# Patient Record
Sex: FEMALE | Race: OTHER | Employment: UNEMPLOYED | URBAN - METROPOLITAN AREA
[De-identification: names, ages, dates, MRNs, and addresses within clinical notes are randomized per-mention and may not be internally consistent; named-entity substitution may affect disease eponyms.]

---

## 2021-07-13 ENCOUNTER — OFFICE VISIT (OUTPATIENT)
Dept: PRIMARY CARE CLINIC | Age: 46
End: 2021-07-13
Payer: COMMERCIAL

## 2021-07-13 VITALS
OXYGEN SATURATION: 99 % | WEIGHT: 137.6 LBS | BODY MASS INDEX: 22.92 KG/M2 | HEART RATE: 61 BPM | HEIGHT: 65 IN | SYSTOLIC BLOOD PRESSURE: 118 MMHG | TEMPERATURE: 97.5 F | DIASTOLIC BLOOD PRESSURE: 75 MMHG | RESPIRATION RATE: 15 BRPM

## 2021-07-13 DIAGNOSIS — Z11.59 NEED FOR HEPATITIS C SCREENING TEST: ICD-10-CM

## 2021-07-13 DIAGNOSIS — Z12.31 ENCOUNTER FOR SCREENING MAMMOGRAM FOR MALIGNANT NEOPLASM OF BREAST: ICD-10-CM

## 2021-07-13 DIAGNOSIS — Z00.00 PHYSICAL EXAM: Primary | ICD-10-CM

## 2021-07-13 DIAGNOSIS — E55.9 VITAMIN D DEFICIENCY: ICD-10-CM

## 2021-07-13 DIAGNOSIS — D50.8 OTHER IRON DEFICIENCY ANEMIA: ICD-10-CM

## 2021-07-13 DIAGNOSIS — Z12.11 COLON CANCER SCREENING: ICD-10-CM

## 2021-07-13 PROCEDURE — 99386 PREV VISIT NEW AGE 40-64: CPT | Performed by: INTERNAL MEDICINE

## 2021-07-13 RX ORDER — BISMUTH SUBSALICYLATE 262 MG
1 TABLET,CHEWABLE ORAL DAILY
COMMUNITY

## 2021-07-13 NOTE — PROGRESS NOTES
Chief Complaint   Patient presents with    New Patient     est. patient       Visit Vitals  /75 (BP 1 Location: Right arm)   Pulse 61   Temp 97.5 °F (36.4 °C)   Resp 15   Ht 5' 5\" (1.651 m)   Wt 137 lb 9.6 oz (62.4 kg)   SpO2 99%   BMI 22.90 kg/m²       1. Have you been to the ER, urgent care clinic since your last visit? Hospitalized since your last visit? No    2. Have you seen or consulted any other health care providers outside of the [address] [place] [address] since your last visit? Include any pap smears or colon screening.  No

## 2021-07-13 NOTE — PROGRESS NOTES
Sukhdev Silva (: 1975) is a 54 y.o. female, new patient, here for evaluation of the following chief complaint(s):  New Patient (est. patient)   Written by Santo Rich, as dictated by Dr. Gwen Vo MD.      ASSESSMENT/PLAN:  Below is the assessment and plan developed based on review of pertinent history, physical exam, labs, studies, and medications. 1. Physical exam  Complete physical done today. Routine lab work ordered. Waiting for results. -     METABOLIC PANEL, COMPREHENSIVE; Future  -     CBC W/O DIFF; Future  -     LIPID PANEL; Future  -     TSH 3RD GENERATION; Future    2. Need for hepatitis C screening test  Ordered Hepatitis C test. Waiting for results.   -     HEPATITIS C AB; Future    3. Encounter for screening mammogram for malignant neoplasm of breast  Ordered screening mammogram. Waiting for results. -     CARLOS 3D ISSAC W MAMMO BI SCREENING INCL CAD; Future    4. Vitamin D deficiency  Ordered lab work to check Vitamin D levels. Waiting for results. Recommend that patient take a Vitamin D supplement of 1,000 units daily. -     VITAMIN D, 25 HYDROXY; Future    5. Other iron deficiency anemia  Ordered lab work to check Vitamin B12 levels. Waiting for results. Recommend that patient take a Vitamin B12 supplement daily. -     VITAMIN B12; Future    6. Colon cancer screening  I ordered a stool test for health maintenance. -     OCCULT BLOOD IMMUNOASSAY,DIAGNOSTIC; Future      SUBJECTIVE/OBJECTIVE:  HPI  Patient presents today to establish care and requesting complete physical exam.  She has not been followed by any physican for 15 years. She does not work. She has 2 sons 25and 13years of age. She c/o heavy periods for the past few years. She has not had a mammogram or pap smear done. She is not followed by OB/GYN. She denies fhx of breast cancer. She is vegetarian. She denies fatigue and numbness or tingling in legs.     She has a hx of anemia and B12 deficiency. She denies any seasonal allergies. She c/o mild nocturia, but drinks lots of water. She sleeps well. She walks for exercise. She c/o BL leg swelling while flying. She denies increased sodium intake. She denies fhx of colon cancer. Current Outpatient Medications on File Prior to Visit   Medication Sig Dispense Refill    multivitamin (ONE A DAY) tablet Take 1 Tablet by mouth daily. No current facility-administered medications on file prior to visit. History reviewed. No pertinent past medical history. Past Surgical History:   Procedure Laterality Date    HX TUBAL LIGATION  2005       Family History   Problem Relation Age of Onset    Diabetes Father     Hypertension Father        Social History     Socioeconomic History    Marital status:      Spouse name: Not on file    Number of children: Not on file    Years of education: Not on file    Highest education level: Not on file   Occupational History    Not on file   Tobacco Use    Smoking status: Never Smoker    Smokeless tobacco: Never Used   Vaping Use    Vaping Use: Never used   Substance and Sexual Activity    Alcohol use: Never    Drug use: Never    Sexual activity: Yes     Partners: Male   Other Topics Concern    Not on file   Social History Narrative    Not on file     Social Determinants of Health     Financial Resource Strain:     Difficulty of Paying Living Expenses:    Food Insecurity:     Worried About Running Out of Food in the Last Year:     Ran Out of Food in the Last Year:    Transportation Needs:     Lack of Transportation (Medical):      Lack of Transportation (Non-Medical):    Physical Activity:     Days of Exercise per Week:     Minutes of Exercise per Session:    Stress:     Feeling of Stress :    Social Connections:     Frequency of Communication with Friends and Family:     Frequency of Social Gatherings with Friends and Family:     Attends Mormonism Services:     Active Member of Clubs or Organizations:     Attends Club or Organization Meetings:     Marital Status:    Intimate Partner Violence:     Fear of Current or Ex-Partner:     Emotionally Abused:     Physically Abused:     Sexually Abused:        No results found for any previous visit. Review of Systems   Constitutional: Negative for activity change, fatigue and unexpected weight change. HENT: Negative for congestion, hearing loss, rhinorrhea and sore throat. Eyes: Negative for discharge. Respiratory: Negative for cough, chest tightness and shortness of breath. Cardiovascular: Negative for leg swelling. Gastrointestinal: Negative for abdominal pain, constipation and diarrhea. Genitourinary: Negative for dysuria, flank pain, frequency and urgency. Heavy cycle   Musculoskeletal: Negative for arthralgias, back pain and myalgias. Skin: Negative for color change and rash. Neurological: Negative for dizziness, light-headedness and headaches. Psychiatric/Behavioral: Negative for dysphoric mood and sleep disturbance. The patient is not nervous/anxious. Visit Vitals  /75 (BP 1 Location: Right arm)   Pulse 61   Temp 97.5 °F (36.4 °C)   Resp 15   Ht 5' 5\" (1.651 m)   Wt 137 lb 9.6 oz (62.4 kg)   SpO2 99%   BMI 22.90 kg/m²      Physical Exam  Vitals and nursing note reviewed. Constitutional:       General: She is not in acute distress. Appearance: Normal appearance. She is well-developed. She is not diaphoretic. HENT:      Right Ear: External ear normal.      Left Ear: External ear normal.   Eyes:      General: No scleral icterus. Right eye: No discharge. Left eye: No discharge. Extraocular Movements: Extraocular movements intact. Conjunctiva/sclera: Conjunctivae normal.   Cardiovascular:      Rate and Rhythm: Normal rate and regular rhythm. Pulmonary:      Effort: Pulmonary effort is normal.      Breath sounds: Normal breath sounds. No wheezing. Abdominal:      General: Bowel sounds are normal.      Palpations: Abdomen is soft. Tenderness: There is no abdominal tenderness. Musculoskeletal:      Cervical back: Normal range of motion and neck supple. Lymphadenopathy:      Cervical: No cervical adenopathy. Neurological:      Mental Status: She is alert and oriented to person, place, and time. Psychiatric:         Mood and Affect: Mood and affect normal.         An electronic signature was used to authenticate this note.   -- Corrina Salgado

## 2021-08-25 ENCOUNTER — HOSPITAL ENCOUNTER (OUTPATIENT)
Dept: MAMMOGRAPHY | Age: 46
Discharge: HOME | End: 2021-08-25
Attending: INTERNAL MEDICINE
Payer: COMMERCIAL

## 2021-08-25 DIAGNOSIS — Z12.31 ENCOUNTER FOR SCREENING MAMMOGRAM FOR MALIGNANT NEOPLASM OF BREAST: ICD-10-CM

## 2021-08-25 PROCEDURE — 77063 BREAST TOMOSYNTHESIS BI: CPT

## 2021-08-27 ENCOUNTER — HOSPITAL ENCOUNTER (OUTPATIENT)
Dept: MAMMOGRAPHY | Age: 46
Discharge: HOME | End: 2021-08-27
Attending: INTERNAL MEDICINE
Payer: COMMERCIAL

## 2021-08-27 ENCOUNTER — HOSPITAL ENCOUNTER (OUTPATIENT)
Dept: MAMMOGRAPHY | Age: 46
Discharge: HOME | End: 2021-08-27
Payer: COMMERCIAL

## 2021-08-27 DIAGNOSIS — R92.8 ABNORMAL MAMMOGRAM OF RIGHT BREAST: ICD-10-CM

## 2021-08-27 PROCEDURE — 77065 DX MAMMO INCL CAD UNI: CPT

## 2021-08-27 PROCEDURE — 76642 ULTRASOUND BREAST LIMITED: CPT

## 2021-09-10 ENCOUNTER — HOSPITAL ENCOUNTER (OUTPATIENT)
Dept: MAMMOGRAPHY | Age: 46
Discharge: HOME | End: 2021-09-10
Attending: INTERNAL MEDICINE
Payer: COMMERCIAL

## 2021-09-10 DIAGNOSIS — R92.8 ABNORMAL MAMMOGRAM OF RIGHT BREAST: ICD-10-CM

## 2021-09-10 PROCEDURE — 77030020004 US BX BREAST VAC RT 1ST LESION W/CLIP AND SPECIMEN

## 2021-09-10 PROCEDURE — 74011000250 HC RX REV CODE- 250: Performed by: RADIOLOGY

## 2021-09-10 PROCEDURE — 77065 DX MAMMO INCL CAD UNI: CPT

## 2021-09-10 PROCEDURE — 88305 TISSUE EXAM BY PATHOLOGIST: CPT

## 2021-09-10 RX ORDER — LIDOCAINE HYDROCHLORIDE 10 MG/ML
5 INJECTION INFILTRATION; PERINEURAL
Status: COMPLETED | OUTPATIENT
Start: 2021-09-10 | End: 2021-09-10

## 2021-09-10 RX ORDER — LIDOCAINE HYDROCHLORIDE AND EPINEPHRINE 10; 10 MG/ML; UG/ML
10 INJECTION, SOLUTION INFILTRATION; PERINEURAL ONCE
Status: COMPLETED | OUTPATIENT
Start: 2021-09-10 | End: 2021-09-10

## 2021-09-10 RX ADMIN — LIDOCAINE HYDROCHLORIDE 5 ML: 10 INJECTION, SOLUTION INFILTRATION; PERINEURAL at 09:20

## 2021-09-10 RX ADMIN — LIDOCAINE HYDROCHLORIDE,EPINEPHRINE BITARTRATE 100 MG: 10; .01 INJECTION, SOLUTION INFILTRATION; PERINEURAL at 09:20

## 2021-09-13 NOTE — PROGRESS NOTES
Pathology approved by Dr. Juliana Fung. Called patient and relayed benign results. Advised patient that she should continue her annual mammogram schedule. She reports that her biopsy site is healing well with no concerns. Encouraged her to call with any question or concerns.

## 2021-10-18 ENCOUNTER — HOSPITAL ENCOUNTER (OUTPATIENT)
Dept: LAB | Age: 46
Discharge: HOME | End: 2021-10-18
Payer: COMMERCIAL

## 2021-10-18 ENCOUNTER — OFFICE VISIT (OUTPATIENT)
Dept: PRIMARY CARE CLINIC | Age: 46
End: 2021-10-18
Payer: COMMERCIAL

## 2021-10-18 VITALS
WEIGHT: 135.2 LBS | TEMPERATURE: 97.8 F | HEART RATE: 56 BPM | DIASTOLIC BLOOD PRESSURE: 71 MMHG | RESPIRATION RATE: 15 BRPM | HEIGHT: 65 IN | OXYGEN SATURATION: 100 % | BODY MASS INDEX: 22.53 KG/M2 | SYSTOLIC BLOOD PRESSURE: 109 MMHG

## 2021-10-18 DIAGNOSIS — Z23 NEED FOR DIPHTHERIA-TETANUS-PERTUSSIS (TDAP) VACCINE: ICD-10-CM

## 2021-10-18 DIAGNOSIS — Z01.419 WELL FEMALE EXAM WITH ROUTINE GYNECOLOGICAL EXAM: ICD-10-CM

## 2021-10-18 DIAGNOSIS — E55.9 VITAMIN D DEFICIENCY: ICD-10-CM

## 2021-10-18 DIAGNOSIS — D50.8 OTHER IRON DEFICIENCY ANEMIA: Primary | ICD-10-CM

## 2021-10-18 DIAGNOSIS — Z12.4 CERVICAL CANCER SCREENING: ICD-10-CM

## 2021-10-18 PROCEDURE — 87624 HPV HI-RISK TYP POOLED RSLT: CPT

## 2021-10-18 PROCEDURE — 88175 CYTOPATH C/V AUTO FLUID REDO: CPT

## 2021-10-18 PROCEDURE — 99213 OFFICE O/P EST LOW 20 MIN: CPT | Performed by: INTERNAL MEDICINE

## 2021-10-18 NOTE — PROGRESS NOTES
Chief Complaint   Patient presents with    Gyn Exam       Visit Vitals  /71 (BP 1 Location: Left arm)   Pulse (!) 56   Temp 97.8 °F (36.6 °C)   Resp 15   Ht 5' 5\" (1.651 m)   Wt 135 lb 3.2 oz (61.3 kg)   LMP 09/25/2021 (Exact Date)   SpO2 100%   BMI 22.50 kg/m²       1. Have you been to the ER, urgent care clinic since your last visit? Hospitalized since your last visit? No    2. Have you seen or consulted any other health care providers outside of the [address] [place] [address] since your last visit? Include any pap smears or colon screening.  No

## 2021-10-18 NOTE — PROGRESS NOTES
Berna Chappell (: 1975) is a 51 y.o. female, established patient, here for evaluation of the following chief complaint(s):  Gyn Exam     Written by Ben Montes, as dictated by Dr. Guerita Singer MD.      ASSESSMENT/PLAN:  Below is the assessment and plan developed based on review of pertinent history, physical exam, labs, studies, and medications. 1. Other iron deficiency anemia  Ordered labs to check CBC levels. Waiting for results. -     CBC WITH AUTOMATED DIFF; Future    2. Vitamin D deficiency  Ordered lab work to check Vitamin D levels. Waiting for results. Continue taking a Vitamin D supplement of 1,000 units daily. -     VITAMIN D, 25 HYDROXY; Future    3. Cervical cancer screening  Completed a Pap smear in the office today. Waiting for results. -     PAP IG, APTIMA HPV AND RFX 16/18,45 ([id number]); Future    4. Well female exam with routine gynecological exam  Completed a routine gynecological exam in the office today. Results were unremarkable. 5. Need for diphtheria-tetanus-pertussis (Tdap) vaccine  Prescribed a Tdap vaccine to be administered at her local pharmacy. -     diphth,pertus,acell,,tetanus (BOOSTRIX TDAP) 2.5-8-5 Lf-mcg-Lf/0.5mL susp suspension; 0.5 mL by IntraMUSCular route once for 1 dose., Normal, Disp-0.5 mL, R-0 sent to pharmacy. SUBJECTIVE/OBJECTIVE:  HPI     Patient presents today for a gynecological examination. She notes her menstrual cycle is regular and heavy. She does not have an IUD nor does she take oral contraceptive pills. She takes an OTC Vitamin D and multivitamin supplements daily. She does not take a Vitamin B or iron supplements. She has been trying to eat foods higher in iron to raise her HGB levels. Current Outpatient Medications on File Prior to Visit   Medication Sig Dispense Refill    multivitamin (ONE A DAY) tablet Take 1 Tablet by mouth daily. No current facility-administered medications on file prior to visit. Not on File    History reviewed. No pertinent past medical history. Past Surgical History:   Procedure Laterality Date    HX TUBAL LIGATION  2005       Family History   Problem Relation Age of Onset    Diabetes Father     Hypertension Father        Social History     Socioeconomic History    Marital status:      Spouse name: Not on file    Number of children: Not on file    Years of education: Not on file    Highest education level: Not on file   Occupational History    Not on file   Tobacco Use    Smoking status: Never Smoker    Smokeless tobacco: Never Used   Vaping Use    Vaping Use: Never used   Substance and Sexual Activity    Alcohol use: Never    Drug use: Never    Sexual activity: Yes     Partners: Male   Other Topics Concern    Not on file   Social History Narrative    Not on file     Social Determinants of Health     Financial Resource Strain:     Difficulty of Paying Living Expenses:    Food Insecurity:     Worried About Running Out of Food in the Last Year:     Ran Out of Food in the Last Year:    Transportation Needs:     Lack of Transportation (Medical):  Lack of Transportation (Non-Medical):    Physical Activity:     Days of Exercise per Week:     Minutes of Exercise per Session:    Stress:     Feeling of Stress :    Social Connections:     Frequency of Communication with Friends and Family:     Frequency of Social Gatherings with Friends and Family:     Attends Sabianist Services:     Active Member of Clubs or Organizations:     Attends Club or Organization Meetings:     Marital Status:    Intimate Partner Violence:     Fear of Current or Ex-Partner:     Emotionally Abused:     Physically Abused:     Sexually Abused:        No visits with results within 3 Month(s) from this visit.    Latest known visit with results is:   Orders Only on 07/13/2021   Component Date Value Ref Range Status    Vitamin B12 07/13/2021 358  193 - 986 pg/mL Final    Hep C virus Ab Interp. 07/13/2021 NONREACTIVE  NONREACTIVE   Final    Hep C  virus Ab comment 07/13/2021 Method used is Siemens Advia Centaur    Final    Vitamin D 25-Hydroxy 07/13/2021 18.3* 30 - 100 ng/mL Final    Comment: (NOTE)  Deficiency               <20 ng/mL  Insufficiency          20-30 ng/mL  Sufficient             ng/mL  Possible toxicity       >100 ng/mL    The Method used is Siemens Advia Centaur currently standardized to a   Center of Disease Control and Prevention (CDC) certified reference   [facility]. Samples containing fluorescein dye can produce falsely   elevated values when tested with the ADVIA Centaur Vitamin D Assay. It is recommended that results in the toxic range, >100 ng/mL, be   retested 72 hours post fluorescein exposure.  TSH 07/13/2021 2.55  0.36 - 3.74 uIU/mL Final    Comment:   Due to TSH heterogeneity, both structurally and degree of glycosylation,  monoclonal antibodies used in the TSH assay may not accurately quantitate TSH. Therefore, this result should be correlated with clinical findings as well as  with other assessments of thyroid function, e.g., free T4, free T3.  Cholesterol, total 07/13/2021 206* <200 MG/DL Final    Triglyceride 07/13/2021 63  <150 MG/DL Final    Comment: Based on NCEP-ATP III:  Triglycerides <150 mg/dL  is considered normal, 150-199  mg/dL  borderline high,  200-499 mg/dL high and  greater than or equal to 500  mg/dL very high.  HDL Cholesterol 07/13/2021 70  MG/DL Final    Comment: Based on NCEP ATP III, HDL Cholesterol <40 mg/dL is considered low and >60  mg/dL is elevated.       LDL, calculated 07/13/2021 123.4* 0 - 100 MG/DL Final    Comment: Based on the NCEP-ATP: LDL-C concentrations are considered  optimal <100 mg/dL,  near optimal/above Normal 100-129 mg/dL Borderline High: 130-159, High: 160-189  mg/dL Very High: Greater than or equal to 190 mg/dL      VLDL, calculated 07/13/2021 12.6  MG/DL Final    CHOL/HDL Ratio 07/13/2021 2.9  0.0 - 5.0   Final    WBC 07/13/2021 3.6  3.6 - 11.0 K/uL Final    RBC 07/13/2021 3.65* 3.80 - 5.20 M/uL Final    HGB 07/13/2021 9.2* 11.5 - 16.0 g/dL Final    HCT 07/13/2021 31.1* 35.0 - 47.0 % Final    MCV 07/13/2021 85.2  80.0 - 99.0 FL Final    MCH 07/13/2021 25.2* 26.0 - 34.0 PG Final    MCHC 07/13/2021 29.6* 30.0 - 36.5 g/dL Final    RDW 07/13/2021 15.7* 11.5 - 14.5 % Final    PLATELET 91/72/2864 991  150 - 400 K/uL Final    MPV 07/13/2021 10.3  8.9 - 12.9 FL Final    NRBC 07/13/2021 0.0  0  WBC Final    ABSOLUTE NRBC 07/13/2021 0.00  0.00 - 0.01 K/uL Final    Sodium 07/13/2021 138  136 - 145 mmol/L Final    Potassium 07/13/2021 4.1  3.5 - 5.1 mmol/L Final    Chloride 07/13/2021 108  97 - 108 mmol/L Final    CO2 07/13/2021 25  21 - 32 mmol/L Final    Anion gap 07/13/2021 5  5 - 15 mmol/L Final    Glucose 07/13/2021 85  65 - 100 mg/dL Final    BUN 07/13/2021 7  6 - 20 MG/DL Final    Creatinine 07/13/2021 0.58  0.55 - 1.02 MG/DL Final    BUN/Creatinine ratio 07/13/2021 12  12 - 20   Final    GFR est AA 07/13/2021 >60  >60 ml/min/1.73m2 Final    GFR est non-AA 07/13/2021 >60  >60 ml/min/1.73m2 Final    Comment: Estimated GFR is calculated using the IDMS-traceable Modification of Diet in  Renal Disease (MDRD) Study equation, reported for both  Americans  (GFRAA) and non- Americans (GFRNA), and normalized to 1.73m2 body  surface area. The physician must decide which value applies to the patient.  Calcium 07/13/2021 9.4  8.5 - 10.1 MG/DL Final    Bilirubin, total 07/13/2021 0.5  0.2 - 1.0 MG/DL Final    ALT (SGPT) 07/13/2021 12  12 - 78 U/L Final    AST (SGOT) 07/13/2021 16  15 - 37 U/L Final    Alk.  phosphatase 07/13/2021 51  45 - 117 U/L Final    Protein, total 07/13/2021 7.4  6.4 - 8.2 g/dL Final    Albumin 07/13/2021 3.7  3.5 - 5.0 g/dL Final    Globulin 07/13/2021 3.7  2.0 - 4.0 g/dL Final    A-G Ratio 07/13/2021 1.0* 1.1 - 2.2   Final       Review of Systems   Constitutional: Negative for activity change, fatigue and unexpected weight change. HENT: Negative for congestion, hearing loss, rhinorrhea and sore throat. Eyes: Negative for discharge. Respiratory: Negative for cough, chest tightness and shortness of breath. Cardiovascular: Negative for leg swelling. Gastrointestinal: Negative for abdominal pain, constipation and diarrhea. Genitourinary: Negative for dysuria, flank pain, frequency and urgency. Musculoskeletal: Negative for arthralgias, back pain and myalgias. Skin: Negative for color change and rash. Neurological: Negative for dizziness, light-headedness and headaches. Psychiatric/Behavioral: Negative for dysphoric mood and sleep disturbance. The patient is not nervous/anxious. Visit Vitals  /71 (BP 1 Location: Left arm)   Pulse (!) 56   Temp 97.8 °F (36.6 °C)   Resp 15   Ht 5' 5\" (1.651 m)   Wt 135 lb 3.2 oz (61.3 kg)   LMP 09/25/2021 (Exact Date)   SpO2 100%   BMI 22.50 kg/m²        Physical Exam  Vitals and nursing note reviewed. Constitutional:       General: She is not in acute distress. Appearance: Normal appearance. She is well-developed. She is not diaphoretic. HENT:      Right Ear: External ear normal.      Left Ear: External ear normal.   Eyes:      General: No scleral icterus. Right eye: No discharge. Left eye: No discharge. Extraocular Movements: Extraocular movements intact. Conjunctiva/sclera: Conjunctivae normal.   Cardiovascular:      Rate and Rhythm: Normal rate and regular rhythm. Pulmonary:      Effort: Pulmonary effort is normal.      Breath sounds: Normal breath sounds. No wheezing. Abdominal:      General: Bowel sounds are normal.      Palpations: Abdomen is soft. Tenderness: There is no abdominal tenderness. Genitourinary:     General: Normal vulva. Vagina: No vaginal discharge.    Musculoskeletal:      Cervical back: Normal range of motion and neck supple. Lymphadenopathy:      Cervical: No cervical adenopathy. Neurological:      Mental Status: She is alert and oriented to person, place, and time. Psychiatric:         Mood and Affect: Mood and affect normal.             An electronic signature was used to authenticate this note.   -- Cayetano Castleman

## 2021-10-21 DIAGNOSIS — B37.31 VAGINAL CANDIDIASIS: Primary | ICD-10-CM

## 2021-10-21 RX ORDER — FLUCONAZOLE 150 MG/1
150 TABLET ORAL DAILY
Qty: 3 TABLET | Refills: 0 | Status: SHIPPED | OUTPATIENT
Start: 2021-10-21 | End: 2021-10-24

## 2021-10-22 NOTE — PROGRESS NOTES
Results reviewed. Release via 3 [facility], your pap smear came back fine but you do have a yeast infection. I would recommend taking Diflucan 1 tablet daily for 3 days. I am sending a medication to the pharmacy.